# Patient Record
Sex: FEMALE | Race: WHITE | NOT HISPANIC OR LATINO | Employment: STUDENT | URBAN - METROPOLITAN AREA
[De-identification: names, ages, dates, MRNs, and addresses within clinical notes are randomized per-mention and may not be internally consistent; named-entity substitution may affect disease eponyms.]

---

## 2018-05-16 ENCOUNTER — CONTACT LENS EXAM ESTABLISHED (OUTPATIENT)
Dept: URBAN - METROPOLITAN AREA CLINIC 40 | Facility: CLINIC | Age: 23
End: 2018-05-16

## 2018-05-16 DIAGNOSIS — H04.123: ICD-10-CM

## 2018-05-16 DIAGNOSIS — H52.13: ICD-10-CM

## 2018-05-16 PROCEDURE — 92499OPNC OPTOMAP RETINAL SCREENING NO CHARGE

## 2018-05-16 PROCEDURE — 92310-1 LEVEL 1 CONTACT LENS MANAGEMENT

## 2018-05-16 PROCEDURE — 92014 COMPRE OPH EXAM EST PT 1/>: CPT

## 2018-05-16 PROCEDURE — 92015 DETERMINE REFRACTIVE STATE: CPT

## 2018-05-16 ASSESSMENT — KERATOMETRY
OS_AXISANGLE2_DEGREES: 082
OD_K2POWER_DIOPTERS: 46.50
OD_K1POWER_DIOPTERS: 46.00
OS_AXISANGLE_DEGREES: 172
OS_K2POWER_DIOPTERS: 46.50
OD_AXISANGLE_DEGREES: 004
OS_K1POWER_DIOPTERS: 46.00
OD_AXISANGLE2_DEGREES: 094

## 2018-05-16 ASSESSMENT — VISUAL ACUITY
OS_CC: 20/20-1
OU_CC: 20/20+2
OD_CC: J1
OS_CC: J1
OU_CC: J1
OD_CC: 20/20

## 2019-06-18 ENCOUNTER — CONTACT LENS EXAM ESTABLISHED (OUTPATIENT)
Dept: URBAN - METROPOLITAN AREA CLINIC 40 | Facility: CLINIC | Age: 24
End: 2019-06-18

## 2019-06-18 DIAGNOSIS — H52.13: ICD-10-CM

## 2019-06-18 DIAGNOSIS — H04.123: ICD-10-CM

## 2019-06-18 PROCEDURE — 92310-1 LEVEL 1 CONTACT LENS MANAGEMENT

## 2019-06-18 PROCEDURE — 92499OPNC OPTOMAP RETINAL SCREENING NO CHARGE

## 2019-06-18 PROCEDURE — 92015 DETERMINE REFRACTIVE STATE: CPT

## 2019-06-18 PROCEDURE — 92014 COMPRE OPH EXAM EST PT 1/>: CPT

## 2019-06-18 ASSESSMENT — KERATOMETRY
OD_AXISANGLE2_DEGREES: 094
OD_AXISANGLE_DEGREES: 004
OD_K2POWER_DIOPTERS: 46.50
OS_K1POWER_DIOPTERS: 46.00
OS_AXISANGLE2_DEGREES: 082
OD_K1POWER_DIOPTERS: 46.00
OS_AXISANGLE_DEGREES: 172
OS_K2POWER_DIOPTERS: 46.50

## 2019-06-18 ASSESSMENT — VISUAL ACUITY
OS_CC: J1
OU_CC: J1
OU_CC: 20/15
OD_CC: J1
OS_CC: 20/20
OD_CC: 20/20

## 2020-02-25 PROBLEM — Z00.00 ENCOUNTER FOR PREVENTIVE HEALTH EXAMINATION: Status: ACTIVE | Noted: 2020-02-25

## 2020-02-28 ENCOUNTER — APPOINTMENT (OUTPATIENT)
Dept: ORTHOPEDIC SURGERY | Facility: CLINIC | Age: 25
End: 2020-02-28
Payer: COMMERCIAL

## 2020-02-28 VITALS — WEIGHT: 155 LBS | HEIGHT: 65 IN | BODY MASS INDEX: 25.83 KG/M2

## 2020-02-28 DIAGNOSIS — M41.9 SCOLIOSIS, UNSPECIFIED: ICD-10-CM

## 2020-02-28 DIAGNOSIS — M53.3 SACROCOCCYGEAL DISORDERS, NOT ELSEWHERE CLASSIFIED: ICD-10-CM

## 2020-02-28 DIAGNOSIS — Z78.9 OTHER SPECIFIED HEALTH STATUS: ICD-10-CM

## 2020-02-28 DIAGNOSIS — M54.9 DORSALGIA, UNSPECIFIED: ICD-10-CM

## 2020-02-28 PROCEDURE — 99215 OFFICE O/P EST HI 40 MIN: CPT

## 2020-02-28 PROCEDURE — 72070 X-RAY EXAM THORAC SPINE 2VWS: CPT

## 2020-02-28 RX ORDER — NORETHINDRONE ACETATE AND ETHINYL ESTRADIOL 5-7-9-7
1-20/1-30/1-35 KIT ORAL
Qty: 84 | Refills: 0 | Status: ACTIVE | COMMUNITY
Start: 2019-12-31

## 2020-02-28 RX ORDER — DICLOFENAC SODIUM 75 MG/1
75 TABLET, DELAYED RELEASE ORAL
Qty: 60 | Refills: 0 | Status: ACTIVE | COMMUNITY
Start: 2020-02-28 | End: 1900-01-01

## 2020-02-28 RX ORDER — RIZATRIPTAN BENZOATE 5 MG/1
5 TABLET, ORALLY DISINTEGRATING ORAL
Qty: 12 | Refills: 0 | Status: ACTIVE | COMMUNITY
Start: 2019-12-20

## 2020-02-29 NOTE — PHYSICAL EXAM
[de-identified] : General: No acute distress, conversant, well-nourished.\par Head: Normocephalic, atraumatic\par Neck: trachea midline, FROM\par Heart: normotensive and normal rate and rhythm\par Lungs: No labored breathing\par Skin: No abrasions, no rashes, no edema\par Psych: Alert and oriented to person, place and time\par Extremities: no peripheral edema or digital cyanosis\par Gait: Normal gait. Can perform tandem gait.  \par Vascular: warm and well perfused distally, palpable distal pulses\par \par MSK:\par Spine:\par Visible scoliotic curve accentuated with Corbin forward bend test.\par Tenderness to palpation in low midback and at SI joints.  No step-off, no deformity.\par No pain or neurologic symptoms with full active range of motion (flexion, extension, lateral bending and rotation).\par \par NEURO EXAM:\par Sensation \par Left L2  -  2/2            \par Left L3  -  2/2\par Left L4  -  2/2\par Left L5  -  2/2\par Left S1  -  2/2\par \par Right L2  -  2/2            \par Right L3  -  2/2\par Right L4  -  2/2\par Right L5  -  2/2\par Right S1  -  2/2\par \par Motor: \par Left L2 (hip flexion)                            5/5                \par Left L3 (knee extension)                   5/5                \par Left L4 (ankle dorsiflexion)                 5/5                \par Left L5 (long toe extensor)                5/5                \par Left S1 (ankle plantar flexion)           5/5\par \par Right L2 (hip flexion)                            5/5                \par Right L3 (knee extension)                   5/5                \par Right L4 (ankle dorsiflexion)                 5/5                \par Right L5 (long toe extensor)                5/5                \par Right S1 (ankle plantar flexion)           5/5\par \par Reflexes: Normal and symmetric\par Negative clonus.  Down-going Babinski.   [de-identified] : Scoliosis AP and Lat radiographs obtained in the office today shows no fracture or dislocation.  S-shaped scoliosis with about 30 degree right-sided thoracic curve and compensatory lumbar curve.  \par

## 2020-02-29 NOTE — ASSESSMENT
[FreeTextEntry1] : 24 year old female with adolescent idiopathic scoliosis who presents with back pain and SI joint pain.  She has no radicular pain and is neurologically intact.  She was given a prescription for diclofenac.  She was given a referral for physical therapy.  She will call to make a followup appointment.  She knows to call with any questions or concerns or if her symptoms acutely worsen.\par

## 2020-02-29 NOTE — HISTORY OF PRESENT ILLNESS
[de-identified] : 24 year old female with adolescent idiopathic scoliosis presents with back pain for the last two months.  She says the back pain is episodic and mostly bothers her at night.  She says it also bothers her if she has been sitting for long periods of time.  She says the pain is interfering with her ability to work out.  She denies radicular pain, numbness, weakness, balance problems or bowel/bladder symptoms. She says when she was an adolescent she wore a night-time brace.  She has tried Motrin for the pain which doesn't help much.

## 2020-03-02 ENCOUNTER — TRANSCRIPTION ENCOUNTER (OUTPATIENT)
Age: 25
End: 2020-03-02

## 2020-03-02 RX ORDER — DICLOFENAC SODIUM 75 MG/1
75 TABLET, DELAYED RELEASE ORAL
Qty: 60 | Refills: 0 | Status: ACTIVE | COMMUNITY
Start: 2020-03-02 | End: 1900-01-01

## 2020-06-10 ENCOUNTER — CONTACT LENS EXAM ESTABLISHED (OUTPATIENT)
Dept: URBAN - METROPOLITAN AREA CLINIC 40 | Facility: CLINIC | Age: 25
End: 2020-06-10

## 2020-06-10 DIAGNOSIS — H04.123: ICD-10-CM

## 2020-06-10 DIAGNOSIS — H52.13: ICD-10-CM

## 2020-06-10 PROCEDURE — 92014 COMPRE OPH EXAM EST PT 1/>: CPT

## 2020-06-10 PROCEDURE — 92310-1 LEVEL 1 CONTACT LENS MANAGEMENT

## 2020-06-10 PROCEDURE — 92015 DETERMINE REFRACTIVE STATE: CPT

## 2020-06-10 PROCEDURE — 92499OP2 OPTOMAP RETINAL SCREENING BOTH EYES

## 2020-06-10 ASSESSMENT — KERATOMETRY
OD_K2POWER_DIOPTERS: 46.75
OD_K1POWER_DIOPTERS: 46.25
OD_K1POWER_DIOPTERS: 46.00
OS_K1POWER_DIOPTERS: 46
OD_AXISANGLE_DEGREES: 167
OS_AXISANGLE2_DEGREES: 95
OD_AXISANGLE_DEGREES: 004
OS_K2POWER_DIOPTERS: 46.5
OS_AXISANGLE_DEGREES: 172
OS_AXISANGLE2_DEGREES: 082
OS_K1POWER_DIOPTERS: 46.00
OD_K2POWER_DIOPTERS: 46.50
OD_AXISANGLE2_DEGREES: 094
OD_AXISANGLE2_DEGREES: 77
OS_K2POWER_DIOPTERS: 46.50
OS_AXISANGLE_DEGREES: 5

## 2020-06-10 ASSESSMENT — VISUAL ACUITY
OD_CC: J1+
OU_CC: 20/15
OU_CC: J1+
OS_CC: J1+
OS_CC: 20/20
OD_CC: 20/20-1

## 2021-01-07 ENCOUNTER — REFRACTION ONLY (OUTPATIENT)
Dept: URBAN - METROPOLITAN AREA CLINIC 40 | Facility: CLINIC | Age: 26
End: 2021-01-07

## 2021-01-07 DIAGNOSIS — H04.123: ICD-10-CM

## 2021-01-07 DIAGNOSIS — H52.13: ICD-10-CM

## 2021-01-07 PROCEDURE — 92310F

## 2021-01-07 ASSESSMENT — KERATOMETRY
OS_K1POWER_DIOPTERS: 46
OD_AXISANGLE2_DEGREES: 094
OD_K1POWER_DIOPTERS: 46.00
OD_AXISANGLE_DEGREES: 004
OS_AXISANGLE2_DEGREES: 95
OS_K2POWER_DIOPTERS: 46.50
OD_AXISANGLE_DEGREES: 167
OD_AXISANGLE2_DEGREES: 77
OD_K1POWER_DIOPTERS: 46.25
OD_K2POWER_DIOPTERS: 46.75
OS_AXISANGLE_DEGREES: 5
OD_K2POWER_DIOPTERS: 46.50
OS_K1POWER_DIOPTERS: 46.00
OS_AXISANGLE_DEGREES: 172
OS_AXISANGLE2_DEGREES: 082
OS_K2POWER_DIOPTERS: 46.5

## 2021-01-07 ASSESSMENT — VISUAL ACUITY
OD_CC: 20/20
OU_CC: 20/15
OS_CC: 20/20

## 2021-09-10 NOTE — PATIENT DISCUSSION
Discussed iLux procedure for best relief of symptoms. Discussed R/B/A and OOP cost. Patient will decide. BLL and BUL plugs done today.

## 2021-09-10 NOTE — PATIENT DISCUSSION
Recommend warm compresses, lid scrubs, artificial tears, Continue Elsi ointment qhs. +Cequa BID OU and Doxy 50mg BID PO.

## 2021-09-10 NOTE — PATIENT DISCUSSION
Recommend SK OU. R/B/A to Superficial Keratectomy discussed with patient. Patient would like to proceed with the Procedure.

## 2021-09-10 NOTE — PATIENT DISCUSSION
The patient has undergone maximal medical therapy with artificial tears and is still with signs and symptoms of ocular surface disease / dry eye syndrome. After risks, benefits, and alternatives were discussed, the patient would like to proceed with punctual plugging of bilateral lower and bilateral upper puncti. Quintess 0.4 x4.

## 2021-09-10 NOTE — PROCEDURE NOTE: CLINICAL
PROCEDURE NOTE: Punctal Plugs, Gevena Anniston ([id number], [id number]) OU. Diagnosis: Keratoconjunctivitis Sicca, Not Specified As Sjögren's. Prior to treatment, the risks/benefits/alternatives were discussed. The patient wished to proceed with procedure. Temporary collagen plugs were inserted. Patient tolerated procedure well. There were no complications. Post procedure instructions given. Carmelita Carlton

## 2021-11-11 NOTE — PATIENT DISCUSSION
Recommend waiting 2 more months then return for repeat measurement. Patient can schedule cataract surgery OS for early next year and we will repeat measurements in office closer to surgery date. Restart FLarex BID x 2 weeks then qday x 2 weeks.

## 2021-11-11 NOTE — PATIENT DISCUSSION
11/11/21 JOD: Recommend continuing Cequa BID in the right eye and start reintroducing Cequa BID in the left eye (patient was experiencing burning after SK initially). Also recommend EyeEco Gentle Foam Cleanser qhs OU as well as switching AT's to Oasis Tears Plus 2-4x/day.

## 2021-11-11 NOTE — PATIENT DISCUSSION
Patient understands condition, prognosis and need for follow up care. Educated patient on visual expectation after cataract surgery.

## 2021-11-11 NOTE — PATIENT DISCUSSION
Will proceed with right eye before the end of the year - can schedule surgery, but will bring patient back to repeat measurements prior. Can also tentatively schedule surgery OS early next year and repeat testing 1-2 week OS prior to surgery.

## 2022-08-23 ENCOUNTER — ESTABLISHED PATIENT (OUTPATIENT)
Dept: URBAN - METROPOLITAN AREA CLINIC 40 | Facility: CLINIC | Age: 27
End: 2022-08-23

## 2022-08-23 DIAGNOSIS — H52.13: ICD-10-CM

## 2022-08-23 DIAGNOSIS — H04.123: ICD-10-CM

## 2022-08-23 PROCEDURE — 92014 COMPRE OPH EXAM EST PT 1/>: CPT

## 2022-08-23 PROCEDURE — 92310-1 LEVEL 1 CONTACT LENS MANAGEMENT

## 2022-08-23 PROCEDURE — 92499OP2 OPTOMAP RETINAL SCREENING BOTH EYES

## 2022-08-23 PROCEDURE — 92015 DETERMINE REFRACTIVE STATE: CPT

## 2022-08-23 ASSESSMENT — KERATOMETRY
OS_K2POWER_DIOPTERS: 46.50
OD_K2POWER_DIOPTERS: 46.50
OS_AXISANGLE_DEGREES: 172
OD_K1POWER_DIOPTERS: 46.25
OS_K2POWER_DIOPTERS: 46.5
OS_AXISANGLE_DEGREES: 5
OD_K1POWER_DIOPTERS: 46.00
OD_AXISANGLE2_DEGREES: 094
OD_K2POWER_DIOPTERS: 46.75
OS_K1POWER_DIOPTERS: 46.00
OD_AXISANGLE_DEGREES: 004
OD_AXISANGLE2_DEGREES: 77
OS_AXISANGLE2_DEGREES: 95
OS_K1POWER_DIOPTERS: 46
OD_AXISANGLE_DEGREES: 167
OS_AXISANGLE2_DEGREES: 082

## 2022-08-23 ASSESSMENT — VISUAL ACUITY
OD_CC: J1+
OD_CC: 20/20
OS_CC: J1+
OU_CC: J1+
OU_CC: 20/20
OS_CC: 20/20-2

## 2022-08-23 ASSESSMENT — TONOMETRY
OS_IOP_MMHG: 14
OD_IOP_MMHG: 14

## 2023-01-31 NOTE — PATIENT DISCUSSION
Preservative free artificial tears 3-6 times per day or more and lubricating gel/ointment qhs to both eyes.

## 2023-01-31 NOTE — PATIENT DISCUSSION
11/11/21 JOD: SK well healed OD. Can proceed with cataract surgery in the right eye. Patient is not a candidate for advanced given mild ERMs OU and irregular ocular surface. Patient is a candidate up to custom vision OU. Discussed need for glasses with GARY marcus parker.

## 2023-12-21 ENCOUNTER — COMPREHENSIVE EXAM (OUTPATIENT)
Dept: URBAN - METROPOLITAN AREA CLINIC 40 | Facility: CLINIC | Age: 28
End: 2023-12-21

## 2023-12-21 DIAGNOSIS — H52.13: ICD-10-CM

## 2023-12-21 PROCEDURE — 92015 DETERMINE REFRACTIVE STATE: CPT

## 2023-12-21 PROCEDURE — 92250E RETINAL SCREENING, ELECTIVE

## 2023-12-21 PROCEDURE — 92014 COMPRE OPH EXAM EST PT 1/>: CPT

## 2023-12-21 PROCEDURE — 92310-1 LEVEL 1 CONTACT LENS MANAGEMENT

## 2023-12-21 ASSESSMENT — KERATOMETRY
OD_K2POWER_DIOPTERS: 46.50
OD_K2POWER_DIOPTERS: 47.0
OD_AXISANGLE_DEGREES: 165
OD_AXISANGLE2_DEGREES: 75
OD_AXISANGLE_DEGREES: 167
OD_K1POWER_DIOPTERS: 46.25
OD_AXISANGLE2_DEGREES: 77
OS_AXISANGLE2_DEGREES: 87
OD_AXISANGLE_DEGREES: 004
OS_K2POWER_DIOPTERS: 46.5
OS_AXISANGLE_DEGREES: 172
OS_K1POWER_DIOPTERS: 46.0
OS_AXISANGLE2_DEGREES: 082
OS_AXISANGLE_DEGREES: 177
OS_K1POWER_DIOPTERS: 46.00
OD_K2POWER_DIOPTERS: 46.75
OS_AXISANGLE2_DEGREES: 95
OS_AXISANGLE_DEGREES: 5
OD_AXISANGLE2_DEGREES: 094
OD_K1POWER_DIOPTERS: 46.00
OS_K1POWER_DIOPTERS: 46
OS_K2POWER_DIOPTERS: 46.50

## 2023-12-21 ASSESSMENT — VISUAL ACUITY
OD_SC: 20/400
OS_SC: J1
OD_CC: 20/20 (SCL)
OS_CC: 20/20  (SCL)
OS_SC: 20/200
OS_CC: J1 (SCL)
OD_SC: J1

## 2023-12-21 ASSESSMENT — TONOMETRY
OS_IOP_MMHG: 12
OD_IOP_MMHG: 13

## 2024-07-09 ENCOUNTER — EMERGENCY VISIT (OUTPATIENT)
Dept: URBAN - METROPOLITAN AREA CLINIC 40 | Facility: CLINIC | Age: 29
End: 2024-07-09

## 2024-07-09 DIAGNOSIS — H04.123: ICD-10-CM

## 2024-07-09 PROCEDURE — 99212 OFFICE O/P EST SF 10 MIN: CPT

## 2024-07-09 ASSESSMENT — VISUAL ACUITY
OS_CC: 20/20
OD_CC: 20/20
OU_CC: 20/15

## 2024-07-09 ASSESSMENT — KERATOMETRY
OS_K2POWER_DIOPTERS: 46.5
OS_AXISANGLE2_DEGREES: 082
OD_K1POWER_DIOPTERS: 46.25
OS_AXISANGLE_DEGREES: 177
OD_K2POWER_DIOPTERS: 46.50
OD_AXISANGLE_DEGREES: 167
OD_K1POWER_DIOPTERS: 46.00
OS_AXISANGLE2_DEGREES: 87
OD_K2POWER_DIOPTERS: 46.75
OS_K1POWER_DIOPTERS: 46
OD_AXISANGLE2_DEGREES: 77
OS_AXISANGLE2_DEGREES: 95
OD_K2POWER_DIOPTERS: 47.0
OD_AXISANGLE_DEGREES: 004
OS_K1POWER_DIOPTERS: 46.00
OD_AXISANGLE2_DEGREES: 094
OS_AXISANGLE_DEGREES: 5
OD_AXISANGLE_DEGREES: 165
OS_K1POWER_DIOPTERS: 46.0
OS_K2POWER_DIOPTERS: 46.50
OS_AXISANGLE_DEGREES: 172
OD_AXISANGLE2_DEGREES: 75

## 2024-07-09 ASSESSMENT — TONOMETRY
OD_IOP_MMHG: 13
OS_IOP_MMHG: 13

## 2024-12-19 ENCOUNTER — COMPREHENSIVE EXAM (OUTPATIENT)
Age: 29
End: 2024-12-19

## 2024-12-19 DIAGNOSIS — H52.13: ICD-10-CM

## 2024-12-19 PROCEDURE — 92310-1 LEVEL 1 SOFT LENS UPDATE

## 2024-12-19 PROCEDURE — 92015 DETERMINE REFRACTIVE STATE: CPT

## 2024-12-19 PROCEDURE — 92250E RETINAL SCREENING, ELECTIVE

## 2024-12-19 PROCEDURE — 92014 COMPRE OPH EXAM EST PT 1/>: CPT

## 2025-08-28 ENCOUNTER — COMPREHENSIVE EXAM (OUTPATIENT)
Age: 30
End: 2025-08-28

## 2025-08-28 DIAGNOSIS — Z97.3: ICD-10-CM

## 2025-08-28 DIAGNOSIS — H52.13: ICD-10-CM

## 2025-08-28 PROCEDURE — 92014 COMPRE OPH EXAM EST PT 1/>: CPT

## 2025-08-28 PROCEDURE — 92250E RETINAL SCREENING, ELECTIVE

## 2025-08-28 PROCEDURE — 92310-1 LEVEL 1 SOFT LENS UPDATE

## 2025-08-28 PROCEDURE — 92015 DETERMINE REFRACTIVE STATE: CPT
